# Patient Record
Sex: FEMALE | Race: WHITE | NOT HISPANIC OR LATINO | Employment: FULL TIME | URBAN - METROPOLITAN AREA
[De-identification: names, ages, dates, MRNs, and addresses within clinical notes are randomized per-mention and may not be internally consistent; named-entity substitution may affect disease eponyms.]

---

## 2024-02-27 ENCOUNTER — OFFICE VISIT (OUTPATIENT)
Dept: OTOLARYNGOLOGY | Facility: CLINIC | Age: 44
End: 2024-02-27
Payer: COMMERCIAL

## 2024-02-27 ENCOUNTER — CLINICAL SUPPORT (OUTPATIENT)
Dept: AUDIOLOGY | Facility: CLINIC | Age: 44
End: 2024-02-27
Payer: COMMERCIAL

## 2024-02-27 VITALS
DIASTOLIC BLOOD PRESSURE: 77 MMHG | TEMPERATURE: 96.8 F | SYSTOLIC BLOOD PRESSURE: 114 MMHG | RESPIRATION RATE: 16 BRPM | BODY MASS INDEX: 23.35 KG/M2 | OXYGEN SATURATION: 99 % | HEART RATE: 99 BPM | WEIGHT: 140.13 LBS | HEIGHT: 65 IN

## 2024-02-27 DIAGNOSIS — H69.90 DYSFUNCTION OF EUSTACHIAN TUBE, UNSPECIFIED LATERALITY: Primary | ICD-10-CM

## 2024-02-27 DIAGNOSIS — H90.A22 SENSORINEURAL HEARING LOSS (SNHL) OF LEFT EAR WITH RESTRICTED HEARING OF RIGHT EAR: ICD-10-CM

## 2024-02-27 DIAGNOSIS — H93.8X1 EAR FULLNESS, RIGHT: Primary | ICD-10-CM

## 2024-02-27 DIAGNOSIS — H90.42 SENSORINEURAL HEARING LOSS (SNHL) OF LEFT EAR WITH UNRESTRICTED HEARING OF RIGHT EAR: ICD-10-CM

## 2024-02-27 DIAGNOSIS — H93.8X1 SENSATION OF FULLNESS IN RIGHT EAR: ICD-10-CM

## 2024-02-27 PROBLEM — S46.911A SHOULDER STRAIN, RIGHT, INITIAL ENCOUNTER: Status: ACTIVE | Noted: 2024-02-27

## 2024-02-27 PROBLEM — M89.8X1 PAIN OF RIGHT CLAVICLE: Status: ACTIVE | Noted: 2024-02-27

## 2024-02-27 PROBLEM — K58.9 IRRITABLE BOWEL SYNDROME: Status: ACTIVE | Noted: 2024-02-27

## 2024-02-27 PROBLEM — L40.0 PLAQUE PSORIASIS: Status: ACTIVE | Noted: 2024-02-27

## 2024-02-27 PROBLEM — N96 HISTORY OF RECURRENT MISCARRIAGES: Status: ACTIVE | Noted: 2024-02-27

## 2024-02-27 PROBLEM — H81.02 MENIERE'S DISEASE OF LEFT EAR: Status: ACTIVE | Noted: 2024-02-27

## 2024-02-27 PROBLEM — R76.8 ANA POSITIVE: Status: ACTIVE | Noted: 2024-02-27

## 2024-02-27 PROBLEM — D89.89 AUTOIMMUNE DISORDER (MULTI): Status: ACTIVE | Noted: 2024-02-27

## 2024-02-27 PROBLEM — E03.9 HYPOTHYROIDISM: Status: ACTIVE | Noted: 2024-02-27

## 2024-02-27 PROBLEM — Z15.89 MTHFR MUTATION: Status: ACTIVE | Noted: 2024-02-27

## 2024-02-27 PROBLEM — H93.12 TINNITUS, SUBJECTIVE, LEFT: Status: ACTIVE | Noted: 2024-02-27

## 2024-02-27 PROCEDURE — 99204 OFFICE O/P NEW MOD 45 MIN: CPT | Performed by: STUDENT IN AN ORGANIZED HEALTH CARE EDUCATION/TRAINING PROGRAM

## 2024-02-27 PROCEDURE — 1036F TOBACCO NON-USER: CPT | Performed by: STUDENT IN AN ORGANIZED HEALTH CARE EDUCATION/TRAINING PROGRAM

## 2024-02-27 PROCEDURE — 99214 OFFICE O/P EST MOD 30 MIN: CPT | Performed by: STUDENT IN AN ORGANIZED HEALTH CARE EDUCATION/TRAINING PROGRAM

## 2024-02-27 PROCEDURE — 92550 TYMPANOMETRY & REFLEX THRESH: CPT | Performed by: AUDIOLOGIST

## 2024-02-27 PROCEDURE — 92557 COMPREHENSIVE HEARING TEST: CPT | Performed by: AUDIOLOGIST

## 2024-02-27 RX ORDER — HYDROCHLOROTHIAZIDE 25 MG/1
TABLET ORAL
COMMUNITY

## 2024-02-27 RX ORDER — LEVOTHYROXINE SODIUM 50 UG/1
TABLET ORAL
COMMUNITY

## 2024-02-27 RX ORDER — NAPROXEN 500 MG/1
TABLET ORAL EVERY 12 HOURS
COMMUNITY
Start: 2022-08-02

## 2024-02-27 RX ORDER — METHYLPREDNISOLONE 4 MG/1
TABLET ORAL
Qty: 21 TABLET | Refills: 0 | Status: SHIPPED | OUTPATIENT
Start: 2024-02-27 | End: 2024-03-05

## 2024-02-27 SDOH — ECONOMIC STABILITY: FOOD INSECURITY: WITHIN THE PAST 12 MONTHS, YOU WORRIED THAT YOUR FOOD WOULD RUN OUT BEFORE YOU GOT MONEY TO BUY MORE.: NEVER TRUE

## 2024-02-27 SDOH — ECONOMIC STABILITY: FOOD INSECURITY: WITHIN THE PAST 12 MONTHS, THE FOOD YOU BOUGHT JUST DIDN'T LAST AND YOU DIDN'T HAVE MONEY TO GET MORE.: NEVER TRUE

## 2024-02-27 ASSESSMENT — ENCOUNTER SYMPTOMS
LOSS OF SENSATION IN FEET: 0
OCCASIONAL FEELINGS OF UNSTEADINESS: 0
DEPRESSION: 0

## 2024-02-27 ASSESSMENT — PAIN SCALES - GENERAL: PAINLEVEL: 0-NO PAIN

## 2024-02-27 ASSESSMENT — COLUMBIA-SUICIDE SEVERITY RATING SCALE - C-SSRS
6. HAVE YOU EVER DONE ANYTHING, STARTED TO DO ANYTHING, OR PREPARED TO DO ANYTHING TO END YOUR LIFE?: NO
2. HAVE YOU ACTUALLY HAD ANY THOUGHTS OF KILLING YOURSELF?: NO
1. IN THE PAST MONTH, HAVE YOU WISHED YOU WERE DEAD OR WISHED YOU COULD GO TO SLEEP AND NOT WAKE UP?: NO

## 2024-02-27 ASSESSMENT — LIFESTYLE VARIABLES
SKIP TO QUESTIONS 9-10: 1
HOW OFTEN DO YOU HAVE A DRINK CONTAINING ALCOHOL: MONTHLY OR LESS
AUDIT-C TOTAL SCORE: 1
HOW OFTEN DO YOU HAVE SIX OR MORE DRINKS ON ONE OCCASION: NEVER
HOW MANY STANDARD DRINKS CONTAINING ALCOHOL DO YOU HAVE ON A TYPICAL DAY: 1 OR 2

## 2024-02-27 ASSESSMENT — PATIENT HEALTH QUESTIONNAIRE - PHQ9
1. LITTLE INTEREST OR PLEASURE IN DOING THINGS: NOT AT ALL
2. FEELING DOWN, DEPRESSED OR HOPELESS: NOT AT ALL
SUM OF ALL RESPONSES TO PHQ9 QUESTIONS 1 AND 2: 0

## 2024-02-27 NOTE — PATIENT INSTRUCTIONS
Taking oral steroids comes with certain risks. One can experience changes in mood, changes in sleep patterns, and memory issues or confusion. In the short term steroids can worsen one's Diabetes and raise blood sugar levels. They can also increase the pressure in one's eyes if one is susceptible to glaucoma. There is also risk of gaining weight. Taking these medications in the long term can result in clouded vision (cataracts), an increased risk of infections, changes in skin or increased bruising, and risk of fractures or joint destruction (avascular necrosis of the hip).

## 2024-02-27 NOTE — PROGRESS NOTES
"AUDIOLOGY ADULT AUDIOMETRIC EVALUATION      Name:  Debra Brown  :  1980  Age:  43 y.o.  Date of Evaluation:  2024    HISTORY  Reason for visit:  change in right hearing  Ms. Brown is seen 2024 at the request of Lowell Beauchamp M.D. for an evaluation of hearing.      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    - Right ear was stable until October; she was sick with a cold and experienced ear pressure with changes in altitude; treated with zpack; this improved but returned 2 weeks later and resolved after second antibiotic    - On Monday of last week (2024), ear pressure returned and was worse Tuesday.  She took Flonase and allergy medicine and was seen at [facility] on 2023.  The did not identify any middle ear issues at [facility].     - Left ear has been.      Hearing loss:  left more than right  Tinnitus:   left, no change  Otitis Media: October; none before that  Otologic surgical history:  denies  Dizziness/imbalance:  denies  Otalgia:  none now, feels pain when ear pops  Ear pressure/fullness:  intermittent right ear pressure  History of excessive noise exposure:  denies  Other: known left hearing loss since , worsening 2017    Hearing aid history: tried Baha, around ; did not perceive significant benefit from Baha          EVALUATION  Please find audiogram in \"Media\" tab (Document Type:  Audiology Report) or included at the bottom of this note.    RESULTS   Otoscopic Evaluation: clear canals bilaterally      Immittance Measures (226 Hz probe tone):   Tympanometry is consistent with normal middle ear pressure and normal tympanic membrane mobility bilaterally.      Note patient experienced dizziness during left tympanometry    Ipsilateral acoustic reflexes (500-4000 Hz) are present for the right ear for 500-4000 Hz and absent for the left ear (500-4000 Hz).      Test technique:  " standard behavioral technique via insert earphones.  Reliability is good.    Pure Tone Audiometry:    Right ear:  Hearing sensitivity is in the normal hearing to mild hearing loss range.     Left ear: Hearing sensitivity is in the moderately-severe to severe hearing loss range. Hearing loss is sensorineural   Note asymmetry across the frequency range (left worse than right)      Speech Audiometry:        Right Ear:  Speech Reception Threshold (SRT) was obtained at 5 dBHL                 Speech discrimination score was 100% in quiet when words were presented at 55 dBHL      Left Ear:  Speech Reception Threshold (SRT) was obtained at 100 dBHL    Speech Awareness Threshold (SAT) was obtained at 60 dBHL                 Speech discrimination score was 16% in quiet when words were presented at 100 dBHL  Note patient experienced dizziness with loud (105 dB) speech stimuli (left).      IMPRESSIONS:  In comparison with previous audiogram of 11/1/2022, here has been worsening of right hearing at 8000 Hz.      Patient is expected to have communication difficulty in adverse listening environments.        Patient is expected to benefit from effective communication strategies and may additionally benefit from devices that improve the desired sound signal over that of background noise.   Patient is not expected to benefit from traditional amplification.  Cochlear implant evaluation may be considered to determine if patient would obtain additional benefit from cochlear implant.      RECOMMENDATIONS  Continue with ENT follow-up with Lowell Beauchamp M.D.   Reassess hearing as medically indicated and at least annually.     Consider Hearing Aid Evaluation with an audiologist to discuss hearing technology (such as hearing aids) and services.   Consider cochlear implant evaluation.    Continue with medical follow-up as indicated.       PATIENT EDUCATION  Discussed results and recommendations with patient.  Questions were addressed  and the patient was encouraged to contact our department should concerns arise.       ANGELA Ojeda, CCC-A  Licensed Audiologist

## 2024-02-27 NOTE — PROGRESS NOTES
SUBJECTIVE  Patient ID: Debra Brown is a 43 y.o. female who presents for New Patient Visit and Hearing Loss.    The patient reports that she has had a few ear infections starting October 2023 associated with pressure in the right ear. Was treated with antibiotics in October at a [facility] which helped.  Recently again seen at [facility] clinic for repeat muffling of hearing on the right.  Thought it was potentially secondary to eustachian tube dysfunction was prescribed fluticasone nasal spray.  Was associated with some otalgia.  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  No otorrhea nor vertigo.    Has longstanding history of left-sided hearing loss. 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 Has baseline left-sided tinnitus.  No other family members with hearing concerns.    Review of Systems  Complete ROS negative except as noted above or on patient intake form and as above.    OBJECTIVE  Physical Exam  CONSTITUTIONAL: Well appearing female who appears stated age.  PSYCHIATRIC: Alert, appropriate mood and affect.  RESPIRATORY: Normal inspiration and expiration and chest wall expansion; no use of accessory muscles to breathe.  VOICE: Clear speech without hoarseness. No stridor nor stertor.  HEAD AND FACE: Symmetric facial features. No cutaneous masses or lesions were visualized.  RIGHT EAR:  Normal external ear and post auricular area, no visible lesions, external auditory canal patent, tympanic membrane intact, no retraction, no signs of mass, effusion, or infection within the middle ear.  LEFT EAR: Normal external ear and post auricular area, no visible lesions, external auditory canal patent, tympanic membrane intact, no retraction, no signs of mass, effusion, or infection within the middle ear.    Audiogram  I personally reviewed the patient's audiogram from today.  This demonstrates an  asymmetric sensorineural hearing loss.  On the right the patient has a normal bordering on mild high-frequency sensorineural hearing loss that is new from last test in 2022.  Otherwise she has excellent hearing on the right.  On the left the patient has a severe sensorineural hearing loss.  She has excellent word recognition on the right but only 16% on the left.  She had type a tympanograms bilaterally.  Acoustic reflexes preserved only on the right.      ASSESSMENT/PLAN  Diagnoses and all orders for this visit:  Dysfunction of Eustachian tube, unspecified laterality  -     methylPREDNISolone (Medrol Dospak) 4 mg tablets; Follow schedule on package instructions  Sensation of fullness in right ear  Sensorineural hearing loss (SNHL) of left ear with unrestricted hearing of right ear      43 y.o. 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  Presenting today with right-sided ear fullness.    The patient has longstanding asymmetric sensorineural hearing loss thought secondary to autoimmune inner ear disease.  The patient was concerned because of recent muffling of hearing on the right and was concerned that she could be developing a sudden sensorineural hearing loss on the right side.  On repeat audiogram today there is borderline new high-frequency sensorineural hearing loss; otherwise her hearing is stable.  Symptoms seem more in line with eustachian tube dysfunction in the setting of recent upper respiratory infections.  We discussed her options including continued observation, continued use of fluticasone nasal spray for the next 3 to 4 weeks, and/or short course steroid pack.  Patient was interested in more acute therapy was interested in a steroid pack.    I recommended she continue to get her hearing checked at least yearly.  Recommended she return sooner if she had concerns.    This note was created using speech recognition transcription software. Despite proofreading,  typographical errors may be present that affect the meaning of the content. Please contact my office with any questions.

## 2024-03-28 ENCOUNTER — APPOINTMENT (OUTPATIENT)
Dept: OTOLARYNGOLOGY | Facility: CLINIC | Age: 44
End: 2024-03-28
Payer: COMMERCIAL

## 2024-03-28 ENCOUNTER — APPOINTMENT (OUTPATIENT)
Dept: AUDIOLOGY | Facility: CLINIC | Age: 44
End: 2024-03-28
Payer: COMMERCIAL

## 2024-07-25 ENCOUNTER — TELEPHONE (OUTPATIENT)
Dept: OTOLARYNGOLOGY | Facility: CLINIC | Age: 44
End: 2024-07-25
Payer: COMMERCIAL

## 2024-07-25 NOTE — TELEPHONE ENCOUNTER
"Called patient discussed recent change in symptoms.  Reports that after a baseball game in which she reported echoing clapping coming off of metal bleachers she felt that there was change in the quality of her hearing.  Reports that hearing is taken on a \"mechanical\" quality although she cannot explain if there is been drop in hearing.  Was seen by her PCP who started her on 60 mg prednisone daily for 10-day course as well as an antiviral.  Discussed that I would recommend stopping the steroid course after 7 days total.  I recommended an audiogram at her earliest convenience which we can work on scheduling; this may be at a different site.  However, the patient or stands I will be on leave starting in a day and will work on arranging follow-up with one of our neurotology colleagues.  Will call her tomorrow with an update.    ----- Message from Arlen BLUM sent at 7/25/2024  9:15 AM EDT -----  Regarding: Sudden HL  Hi,    This patient called stating she has had a sudden change in hearing and would like to be seen. We do not have an available HT until Wednesday next week. She wants to be seen for a steroid injection without a Hearing Test but I told her we need her to have a hearing test with her sudden change in hearing. Assuming she might need follow up and you are going on leave, is there maybe a provider we can refer her to? Thanks!    Arlen"

## 2025-03-24 LAB
NON-UH HIE FREE T4: 1.15 NG/DL (ref 0.89–1.76)
NON-UH HIE TSH: 1.87 UIU/ML (ref 0.55–4.78)
NON-UH HIE VIT D 25: 45 NG/ML

## 2025-03-25 LAB
NON-UH HIE THYROGLOBULIN ANTIBODY: <1.5 IU/ML (ref 0–4)
NON-UH HIE THYROID (TPO) AB: 0.5 IU/ML (ref 0–9)

## 2025-03-27 LAB — NON-UH HIE THYROGLOBULIN: NORMAL
